# Patient Record
Sex: MALE | Race: BLACK OR AFRICAN AMERICAN | NOT HISPANIC OR LATINO | Employment: UNEMPLOYED | URBAN - NONMETROPOLITAN AREA
[De-identification: names, ages, dates, MRNs, and addresses within clinical notes are randomized per-mention and may not be internally consistent; named-entity substitution may affect disease eponyms.]

---

## 2023-07-19 PROBLEM — F20.9 SCHIZOPHRENIA: Status: ACTIVE | Noted: 2023-07-19

## 2023-07-19 PROBLEM — I10 HTN (HYPERTENSION): Status: ACTIVE | Noted: 2023-07-19

## 2023-09-20 PROBLEM — E78.5 HLD (HYPERLIPIDEMIA): Status: ACTIVE | Noted: 2023-09-20

## 2023-09-20 PROBLEM — Z13.9 ENCOUNTER FOR MEDICAL SCREENING EXAMINATION: Status: ACTIVE | Noted: 2023-09-20

## 2023-10-04 PROBLEM — F31.63 BIPOLAR 1 DISORDER, MIXED, SEVERE: Status: ACTIVE | Noted: 2023-10-04

## 2023-12-25 PROBLEM — Z13.9 ENCOUNTER FOR MEDICAL SCREENING EXAMINATION: Status: RESOLVED | Noted: 2023-09-20 | Resolved: 2023-12-25

## 2024-02-29 ENCOUNTER — HOSPITAL ENCOUNTER (INPATIENT)
Facility: HOSPITAL | Age: 29
LOS: 5 days | Discharge: HOME | DRG: 885 | End: 2024-03-05
Attending: PSYCHIATRY & NEUROLOGY | Admitting: PSYCHIATRY & NEUROLOGY
Payer: COMMERCIAL

## 2024-02-29 DIAGNOSIS — F29 PSYCHOSIS: ICD-10-CM

## 2024-02-29 LAB
CHOLEST SERPL-MCNC: 222 MG/DL (ref 120–199)
CHOLEST/HDLC SERPL: 5.6 {RATIO} (ref 2–5)
ESTIMATED AVG GLUCOSE: 120 MG/DL (ref 68–131)
HBA1C MFR BLD: 5.8 % (ref 4–5.6)
HDLC SERPL-MCNC: 40 MG/DL (ref 40–75)
HDLC SERPL: 18 % (ref 20–50)
LDLC SERPL CALC-MCNC: 163.6 MG/DL (ref 63–159)
NONHDLC SERPL-MCNC: 182 MG/DL
TRIGL SERPL-MCNC: 92 MG/DL (ref 30–150)

## 2024-02-29 PROCEDURE — 25000003 PHARM REV CODE 250: Performed by: STUDENT IN AN ORGANIZED HEALTH CARE EDUCATION/TRAINING PROGRAM

## 2024-02-29 PROCEDURE — 25000003 PHARM REV CODE 250: Performed by: INTERNAL MEDICINE

## 2024-02-29 PROCEDURE — 36415 COLL VENOUS BLD VENIPUNCTURE: CPT | Performed by: PSYCHIATRY & NEUROLOGY

## 2024-02-29 PROCEDURE — 99223 1ST HOSP IP/OBS HIGH 75: CPT | Mod: ,,, | Performed by: STUDENT IN AN ORGANIZED HEALTH CARE EDUCATION/TRAINING PROGRAM

## 2024-02-29 PROCEDURE — 11400000 HC PSYCH PRIVATE ROOM

## 2024-02-29 PROCEDURE — 80061 LIPID PANEL: CPT | Performed by: PSYCHIATRY & NEUROLOGY

## 2024-02-29 PROCEDURE — 83036 HEMOGLOBIN GLYCOSYLATED A1C: CPT | Performed by: PSYCHIATRY & NEUROLOGY

## 2024-02-29 RX ORDER — BENZTROPINE MESYLATE 1 MG/ML
2 INJECTION, SOLUTION INTRAMUSCULAR; INTRAVENOUS EVERY 8 HOURS PRN
Status: DISCONTINUED | OUTPATIENT
Start: 2024-02-29 | End: 2024-03-05 | Stop reason: HOSPADM

## 2024-02-29 RX ORDER — ALUMINUM HYDROXIDE, MAGNESIUM HYDROXIDE, AND SIMETHICONE 1200; 120; 1200 MG/30ML; MG/30ML; MG/30ML
30 SUSPENSION ORAL EVERY 6 HOURS PRN
Status: DISCONTINUED | OUTPATIENT
Start: 2024-02-29 | End: 2024-03-05 | Stop reason: HOSPADM

## 2024-02-29 RX ORDER — IBUPROFEN 200 MG
1 TABLET ORAL DAILY PRN
Status: DISCONTINUED | OUTPATIENT
Start: 2024-02-29 | End: 2024-03-05 | Stop reason: HOSPADM

## 2024-02-29 RX ORDER — ACETAMINOPHEN 325 MG/1
650 TABLET ORAL EVERY 6 HOURS PRN
Status: DISCONTINUED | OUTPATIENT
Start: 2024-02-29 | End: 2024-03-05 | Stop reason: HOSPADM

## 2024-02-29 RX ORDER — LOPERAMIDE HYDROCHLORIDE 2 MG/1
2 CAPSULE ORAL
Status: DISCONTINUED | OUTPATIENT
Start: 2024-02-29 | End: 2024-03-05 | Stop reason: HOSPADM

## 2024-02-29 RX ORDER — BENZONATATE 100 MG/1
100 CAPSULE ORAL 3 TIMES DAILY PRN
Status: DISCONTINUED | OUTPATIENT
Start: 2024-02-29 | End: 2024-03-05 | Stop reason: HOSPADM

## 2024-02-29 RX ORDER — OLANZAPINE 10 MG/2ML
10 INJECTION, POWDER, FOR SOLUTION INTRAMUSCULAR EVERY 8 HOURS PRN
Status: DISCONTINUED | OUTPATIENT
Start: 2024-02-29 | End: 2024-03-05 | Stop reason: HOSPADM

## 2024-02-29 RX ORDER — ONDANSETRON 4 MG/1
4 TABLET, ORALLY DISINTEGRATING ORAL EVERY 8 HOURS PRN
Status: DISCONTINUED | OUTPATIENT
Start: 2024-02-29 | End: 2024-03-05 | Stop reason: HOSPADM

## 2024-02-29 RX ORDER — OLANZAPINE 10 MG/1
10 TABLET ORAL EVERY 8 HOURS PRN
Status: DISCONTINUED | OUTPATIENT
Start: 2024-02-29 | End: 2024-03-05 | Stop reason: HOSPADM

## 2024-02-29 RX ORDER — AMLODIPINE BESYLATE 10 MG/1
10 TABLET ORAL DAILY
Status: DISCONTINUED | OUTPATIENT
Start: 2024-02-29 | End: 2024-03-05 | Stop reason: HOSPADM

## 2024-02-29 RX ORDER — PROMETHAZINE HYDROCHLORIDE 25 MG/1
25 TABLET ORAL EVERY 6 HOURS PRN
Status: DISCONTINUED | OUTPATIENT
Start: 2024-02-29 | End: 2024-03-05 | Stop reason: HOSPADM

## 2024-02-29 RX ORDER — HYDROXYZINE PAMOATE 50 MG/1
50 CAPSULE ORAL EVERY 6 HOURS PRN
Status: DISCONTINUED | OUTPATIENT
Start: 2024-02-29 | End: 2024-03-05 | Stop reason: HOSPADM

## 2024-02-29 RX ORDER — DIVALPROEX SODIUM 500 MG/1
500 TABLET, FILM COATED, EXTENDED RELEASE ORAL 2 TIMES DAILY
Status: DISCONTINUED | OUTPATIENT
Start: 2024-02-29 | End: 2024-03-05 | Stop reason: HOSPADM

## 2024-02-29 RX ADMIN — AMLODIPINE BESYLATE 10 MG: 10 TABLET ORAL at 11:02

## 2024-02-29 RX ADMIN — DIVALPROEX SODIUM 500 MG: 500 TABLET, FILM COATED, EXTENDED RELEASE ORAL at 08:02

## 2024-02-29 RX ADMIN — DIVALPROEX SODIUM 500 MG: 500 TABLET, FILM COATED, EXTENDED RELEASE ORAL at 12:02
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
## 2024-02-29 NOTE — ASSESSMENT & PLAN NOTE
Lab Results   Component Value Date    CHOL 222 (H) 02/29/2024    CHOL 249 (H) 06/03/2023    CHOL 237 (H) 09/16/2021     Lab Results   Component Value Date    HDL 40 02/29/2024    HDL 43 06/03/2023    HDL 34 (L) 09/16/2021     Lab Results   Component Value Date    LDLCALC 163.6 (H) 02/29/2024    LDLCALC 189 (H) 06/03/2023    LDLCALC 186 (H) 09/16/2021     Lab Results   Component Value Date    TRIG 92 02/29/2024    TRIG 86 06/03/2023    TRIG 83 09/16/2021       Lab Results   Component Value Date    CHOLHDL 18.0 (L) 02/29/2024    CHOLHDL 5.79 (H) 06/03/2023    CHOLHDL 6.97 (H) 09/16/2021

## 2024-02-29 NOTE — CONSULTS
RD consulted for new admit. As per chart the pt's appetite is adequate and pt is eating 100% of meals. Pt has no dietary issues at this time and nutrition services are not warranted. Please re-consult if any nutrition/dietary issues arise.

## 2024-02-29 NOTE — SUBJECTIVE & OBJECTIVE
No past medical history on file.    No past surgical history on file.    Review of patient's allergies indicates:  No Known Allergies    Current Facility-Administered Medications on File Prior to Encounter   Medication    [COMPLETED] cloNIDine tablet 0.2 mg    [COMPLETED] diphenhydrAMINE injection 50 mg    [COMPLETED] haloperidol lactate injection 5 mg     Current Outpatient Medications on File Prior to Encounter   Medication Sig    amLODIPine (NORVASC) 10 MG tablet Take 1 tablet (10 mg total) by mouth once daily.    divalproex (DEPAKOTE) 250 MG EC tablet Take 3 tablets (750 mg total) by mouth every evening.    paliperidone palmitate (INVEGA SUSTENNA) 156 mg/mL Syrg injection Inject 1 mL (156 mg total) into the muscle every 28 days.    sertraline (ZOLOFT) 100 MG tablet Take 1 tablet (100 mg total) by mouth once daily.    traZODone (DESYREL) 50 MG tablet Take 1 tablet (50 mg total) by mouth every evening.     Family History    None       Tobacco Use    Smoking status: Not on file    Smokeless tobacco: Not on file   Substance and Sexual Activity    Alcohol use: Not on file    Drug use: Not on file    Sexual activity: Not on file     Review of Systems   Constitutional:  Negative for fatigue and fever.   HENT:  Negative for congestion, ear pain and sore throat.    Eyes:  Negative for pain and discharge.   Respiratory:  Negative for cough, shortness of breath and wheezing.    Gastrointestinal:  Negative for abdominal pain, constipation, diarrhea, nausea and vomiting.   Endocrine: Negative for cold intolerance and heat intolerance.   Genitourinary:  Negative for difficulty urinating, dysuria and frequency.   Musculoskeletal:  Negative for arthralgias.   Allergic/Immunologic: Negative for environmental allergies.   Neurological:  Negative for dizziness, tremors and seizures.   Psychiatric/Behavioral:  Positive for behavioral problems and hallucinations. The patient is nervous/anxious.    All other systems reviewed and are  negative.    Objective:     Vital Signs (Most Recent):  Temp: 97.9 °F (36.6 °C) (02/29/24 0810)  Pulse: 97 (02/29/24 0810)  Resp: 20 (02/29/24 0810)  BP: (!) 118/56 (02/29/24 0810)  SpO2: 96 % (02/29/24 0810) Vital Signs (24h Range):  Temp:  [97.8 °F (36.6 °C)-98.2 °F (36.8 °C)] 97.9 °F (36.6 °C)  Pulse:  [] 97  Resp:  [16-21] 20  SpO2:  [94 %-96 %] 96 %  BP: (118-176)/() 118/56     Weight: 124.7 kg (274 lb 14.6 oz)  Body mass index is 40.6 kg/m².     Physical Exam  Vitals and nursing note reviewed.   Constitutional:       Appearance: Normal appearance. He is obese.   HENT:      Head: Normocephalic and atraumatic.      Nose: Nose normal.      Mouth/Throat:      Mouth: Mucous membranes are moist.      Pharynx: Oropharynx is clear.   Eyes:      Extraocular Movements: Extraocular movements intact.      Conjunctiva/sclera: Conjunctivae normal.      Pupils: Pupils are equal, round, and reactive to light.   Cardiovascular:      Rate and Rhythm: Normal rate and regular rhythm.      Pulses: Normal pulses.      Heart sounds: Normal heart sounds.   Pulmonary:      Effort: Pulmonary effort is normal.      Breath sounds: Normal breath sounds.   Abdominal:      General: Abdomen is flat. Bowel sounds are normal.      Palpations: Abdomen is soft.   Musculoskeletal:         General: Normal range of motion.      Cervical back: Normal range of motion and neck supple.   Skin:     General: Skin is warm and dry.      Capillary Refill: Capillary refill takes less than 2 seconds.      Comments: No rashes on limited skin exam.   Neurological:      General: No focal deficit present.      Mental Status: He is alert and oriented to person, place, and time.      Cranial Nerves: No cranial nerve deficit.      Comments: I Olfactory:  Sense of smell intact    II Optic:  Pupils equal round react to light.  Vision intact.    III, IV, VI, Ocular motor, Trochlear, Abducens:  Extraocular movements intact    V Trigeminal:  Facial sensation  intact facial sensation intact,, muscles of mastication intact muscles of mastication intact, corneal reflex intact, corneal reflex intact    VII Facial:  Muscles of facial expression intact     VIII Vestibular cochlear: Hearing intact vestibular cochlear: Hearing intact    IX Glossopharyngeal:  Gag reflex intact.  Tasting intact.     X Vagus:  Gag reflex intact.    XI Spinal Accessory:  Shoulder shrug intact.  Head rotation intact.    XII Hypoglossal:  Tongue movements intact.     Psychiatric:         Attention and Perception: He perceives auditory and visual hallucinations.         Mood and Affect: Mood is anxious.         Thought Content: Thought content is paranoid.         Cognition and Memory: Cognition is impaired.         Judgment: Judgment is inappropriate.              CRANIAL NERVES     CN III, IV, VI   Pupils are equal, round, and reactive to light.       Significant Labs: All pertinent labs within the past 24 hours have been reviewed.    Significant Imaging: I have reviewed all pertinent imaging results/findings within the past 24 hours.

## 2024-02-29 NOTE — ASSESSMENT & PLAN NOTE
Chronic, controlled. Latest blood pressure and vitals reviewed-     Temp:  [97.8 °F (36.6 °C)-98.2 °F (36.8 °C)]   Pulse:  []   Resp:  [16-21]   BP: (118-176)/()   SpO2:  [94 %-96 %] .   Home meds for hypertension were reviewed and noted below.   Hypertension Medications               amLODIPine (NORVASC) 10 MG tablet Take 1 tablet (10 mg total) by mouth once daily.            While in the hospital, will manage blood pressure as follows; Continue home antihypertensive regimen    Will utilize p.r.n. blood pressure medication only if patient's blood pressure greater than 180/110 and he develops symptoms such as worsening chest pain or shortness of breath.

## 2024-02-29 NOTE — H&P
[facility] Hospital Medicine  History & Physical    Patient Name: Jaki Gong  MRN: [id number]  Patient Class: IP- Psych  Admission Date: 2/29/2024  Attending Physician: Alejandro Sparrow MD   Primary Care Provider: Anu Primary Doctor         Patient information was obtained from ER records.     Subjective:     Principal Problem:Schizophrenia    Chief Complaint: No chief complaint on file.       HPI:   Psychiatric Evaluation        AUDITORY/VISUAL HALLUCINATIONS, PATIENT VERBALIZED KNOWING THEY ARE NOT REAL, MAKING STATEMENTS ABOUT BERRY AND THE DEVIL, REPORTS MISSING MEDICATIONS      This is a 28 year old male with schizophrenia who presents to the ED with father after concerns of psychotic behavior and not taking his psychotropic medications for over 2 days.  He has a flat affect with delusions and hallucinations.  The history is limited.  He denies suicidal or homicidal ideation.     No past medical history on file.    No past surgical history on file.    Review of patient's allergies indicates:  No Known Allergies    Current Facility-Administered Medications on File Prior to Encounter   Medication    [COMPLETED] cloNIDine tablet 0.2 mg    [COMPLETED] diphenhydrAMINE injection 50 mg    [COMPLETED] haloperidol lactate injection 5 mg     Current Outpatient Medications on File Prior to Encounter   Medication Sig    amLODIPine (NORVASC) 10 MG tablet Take 1 tablet (10 mg total) by mouth once daily.    divalproex (DEPAKOTE) 250 MG EC tablet Take 3 tablets (750 mg total) by mouth every evening.    paliperidone palmitate (INVEGA SUSTENNA) 156 mg/mL Syrg injection Inject 1 mL (156 mg total) into the muscle every 28 days.    sertraline (ZOLOFT) 100 MG tablet Take 1 tablet (100 mg total) by mouth once daily.    traZODone (DESYREL) 50 MG tablet Take 1 tablet (50 mg total) by mouth every evening.     Family History    None       Tobacco Use    Smoking status: Not on file    Smokeless  tobacco: Not on file   Substance and Sexual Activity    Alcohol use: Not on file    Drug use: Not on file    Sexual activity: Not on file     Review of Systems   Constitutional:  Negative for fatigue and fever.   HENT:  Negative for congestion, ear pain and sore throat.    Eyes:  Negative for pain and discharge.   Respiratory:  Negative for cough, shortness of breath and wheezing.    Gastrointestinal:  Negative for abdominal pain, constipation, diarrhea, nausea and vomiting.   Endocrine: Negative for cold intolerance and heat intolerance.   Genitourinary:  Negative for difficulty urinating, dysuria and frequency.   Musculoskeletal:  Negative for arthralgias.   Allergic/Immunologic: Negative for environmental allergies.   Neurological:  Negative for dizziness, tremors and seizures.   Psychiatric/Behavioral:  Positive for behavioral problems and hallucinations. The patient is nervous/anxious.    All other systems reviewed and are negative.    Objective:     Vital Signs (Most Recent):  Temp: 97.9 °F (36.6 °C) (02/29/24 0810)  Pulse: 97 (02/29/24 0810)  Resp: 20 (02/29/24 0810)  BP: (!) 118/56 (02/29/24 0810)  SpO2: 96 % (02/29/24 0810) Vital Signs (24h Range):  Temp:  [97.8 °F (36.6 °C)-98.2 °F (36.8 °C)] 97.9 °F (36.6 °C)  Pulse:  [] 97  Resp:  [16-21] 20  SpO2:  [94 %-96 %] 96 %  BP: (118-176)/() 118/56     Weight: 124.7 kg (274 lb 14.6 oz)  Body mass index is 40.6 kg/m².     Physical Exam  Vitals and nursing note reviewed.   Constitutional:       Appearance: Normal appearance. He is obese.   HENT:      Head: Normocephalic and atraumatic.      Nose: Nose normal.      Mouth/Throat:      Mouth: Mucous membranes are moist.      Pharynx: Oropharynx is clear.   Eyes:      Extraocular Movements: Extraocular movements intact.      Conjunctiva/sclera: Conjunctivae normal.      Pupils: Pupils are equal, round, and reactive to light.   Cardiovascular:      Rate and Rhythm: Normal rate and regular rhythm.       Pulses: Normal pulses.      Heart sounds: Normal heart sounds.   Pulmonary:      Effort: Pulmonary effort is normal.      Breath sounds: Normal breath sounds.   Abdominal:      General: Abdomen is flat. Bowel sounds are normal.      Palpations: Abdomen is soft.   Musculoskeletal:         General: Normal range of motion.      Cervical back: Normal range of motion and neck supple.   Skin:     General: Skin is warm and dry.      Capillary Refill: Capillary refill takes less than 2 seconds.      Comments: No rashes on limited skin exam.   Neurological:      General: No focal deficit present.      Mental Status: He is alert and oriented to person, place, and time.      Cranial Nerves: No cranial nerve deficit.      Comments: I Olfactory:  Sense of smell intact    II Optic:  Pupils equal round react to light.  Vision intact.    III, IV, VI, Ocular motor, Trochlear, Abducens:  Extraocular movements intact    V Trigeminal:  Facial sensation intact facial sensation intact,, muscles of mastication intact muscles of mastication intact, corneal reflex intact, corneal reflex intact    VII Facial:  Muscles of facial expression intact     VIII Vestibular cochlear: Hearing intact vestibular cochlear: Hearing intact    IX Glossopharyngeal:  Gag reflex intact.  Tasting intact.     X Vagus:  Gag reflex intact.    XI Spinal Accessory:  Shoulder shrug intact.  Head rotation intact.    XII Hypoglossal:  Tongue movements intact.     Psychiatric:         Attention and Perception: He perceives auditory and visual hallucinations.         Mood and Affect: Mood is anxious.         Thought Content: Thought content is paranoid.         Cognition and Memory: Cognition is impaired.         Judgment: Judgment is inappropriate.              CRANIAL NERVES     CN III, IV, VI   Pupils are equal, round, and reactive to light.       Significant Labs: All pertinent labs within the past 24 hours have been reviewed.    Significant Imaging: I have reviewed  all pertinent imaging results/findings within the past 24 hours.  Assessment/Plan:     * Schizophrenia  To be admitted to our inpatient psychiatric unit for further evaluation and management.      Bipolar 1 disorder, mixed, severe  To be admitted to our inpatient psychiatric unit for further evaluation and management.        HLD (hyperlipidemia)  Lab Results   Component Value Date    CHOL 222 (H) 02/29/2024    CHOL 249 (H) 06/03/2023    CHOL 237 (H) 09/16/2021     Lab Results   Component Value Date    HDL 40 02/29/2024    HDL 43 06/03/2023    HDL 34 (L) 09/16/2021     Lab Results   Component Value Date    LDLCALC 163.6 (H) 02/29/2024    LDLCALC 189 (H) 06/03/2023    LDLCALC 186 (H) 09/16/2021     Lab Results   Component Value Date    TRIG 92 02/29/2024    TRIG 86 06/03/2023    TRIG 83 09/16/2021       Lab Results   Component Value Date    CHOLHDL 18.0 (L) 02/29/2024    CHOLHDL 5.79 (H) 06/03/2023    CHOLHDL 6.97 (H) 09/16/2021         HTN (hypertension)  Chronic, controlled. Latest blood pressure and vitals reviewed-     Temp:  [97.8 °F (36.6 °C)-98.2 °F (36.8 °C)]   Pulse:  []   Resp:  [16-21]   BP: (118-176)/()   SpO2:  [94 %-96 %] .   Home meds for hypertension were reviewed and noted below.   Hypertension Medications               amLODIPine (NORVASC) 10 MG tablet Take 1 tablet (10 mg total) by mouth once daily.            While in the hospital, will manage blood pressure as follows; Continue home antihypertensive regimen    Will utilize p.r.n. blood pressure medication only if patient's blood pressure greater than 180/110 and he develops symptoms such as worsening chest pain or shortness of breath.      VTE Risk Mitigation (From admission, onward)      None                            Paulie Valero Jr, MD  Department of Hospital Medicine  [facility] ([facility])

## 2024-02-29 NOTE — NURSING
Upon arrival to unit, Patient did not want to participate in the interview process for admission.

## 2024-02-29 NOTE — NURSING
This is a 28 year old male with schizophrenia who presents to the ED with father after concerns of psychotic behavior and not taking his psychotropic medications for over 2 days.  He has a flat affect with delusions and hallucinations.  The history is limited.  He denies suicidal or homicidal ideation. Patient making statements about the devil and Brianne and the devil.         Patient very sleepy upon arrival. Patient asked to go to bed as soon as he came on the unit.  Very bad body order and did not want to participate in medication

## 2024-02-29 NOTE — NURSING
Plan of care reviewed.  Pt has been in his bedroom most of the day.  Has only been out of his room for meals.  Pt remains withdrawn,isolative, hearing voices.   States he cannot make out what the voices are telling him.  States he has been noncompliant with his meds for the last year or two.  Pt denies si, hi.  Pt took am meds without any side effects noted.  Appetite adequate..  pt instructed to call staff for any needs or concerns at all. Verbalized understanding.  Will cont to monitor for safety.  Patient care ongoing.

## 2024-02-29 NOTE — HPI
Psychiatric Evaluation        AUDITORY/VISUAL HALLUCINATIONS, PATIENT VERBALIZED KNOWING THEY ARE NOT REAL, MAKING STATEMENTS ABOUT BERRY AND THE DEVIL, REPORTS MISSING MEDICATIONS      This is a 28 year old male with schizophrenia who presents to the ED with father after concerns of psychotic behavior and not taking his psychotropic medications for over 2 days.  He has a flat affect with delusions and hallucinations.  The history is limited.  He denies suicidal or homicidal ideation.

## 2024-03-01 PROCEDURE — 11400000 HC PSYCH PRIVATE ROOM

## 2024-03-01 PROCEDURE — 25000003 PHARM REV CODE 250: Performed by: INTERNAL MEDICINE

## 2024-03-01 PROCEDURE — 99232 SBSQ HOSP IP/OBS MODERATE 35: CPT | Mod: ,,, | Performed by: PSYCHIATRY & NEUROLOGY

## 2024-03-01 PROCEDURE — 90833 PSYTX W PT W E/M 30 MIN: CPT | Mod: ,,, | Performed by: PSYCHIATRY & NEUROLOGY

## 2024-03-01 PROCEDURE — 25000003 PHARM REV CODE 250: Performed by: STUDENT IN AN ORGANIZED HEALTH CARE EDUCATION/TRAINING PROGRAM

## 2024-03-01 RX ADMIN — DIVALPROEX SODIUM 500 MG: 500 TABLET, FILM COATED, EXTENDED RELEASE ORAL at 08:03

## 2024-03-01 RX ADMIN — AMLODIPINE BESYLATE 10 MG: 10 TABLET ORAL at 08:03

## 2024-03-01 RX ADMIN — DIVALPROEX SODIUM 500 MG: 500 TABLET, FILM COATED, EXTENDED RELEASE ORAL at 09:03
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
## 2024-03-01 NOTE — PROGRESS NOTES
Attempted x2 to call Radha ([email or web address]) to ascertain most recent [facility] info. No answer, no option to leave voicemail.

## 2024-03-01 NOTE — PLAN OF CARE
Problem: Adult Behavioral Health Plan of Care  Goal: Develops/Participates in Therapeutic [place] to Support Successful Transition  Intervention: Mutually Develop Transition Plan  Flowsheets (Taken 3/1/2024 4559)  Current Discharge Risk:   psychiatric illness   cognitively impaired  Readmission Within the Last 30 Days: no previous admission in last 30 days  Outpatient/Agency/Support Group Needs: [facility]  Transition Support: follow-up care discussed  Anticipated Discharge Disposition: home with family  Transportation Concerns: none  Patient/Family Anticipated Services at Transition:   mental health services   outpatient care  Patient/Family Anticipates Transition to: home with family  Transportation Anticipated: health plan transportation  Concerns to be Addressed:   mental health   medication

## 2024-03-01 NOTE — PLAN OF CARE
POC reviewed this shift and is on going. Patient is withdrawn, depressed, calm, cooperative, quiet, anxious, and sleeping. Endorses Auditory Hallucinations. Denies Suicidal Ideation, Homicidal Ideation, Visual Hallucinations, Tactile Hallucinations, Gustatory Hallucinations, and Delusions. Patient is withdrawn to his room all day. Patient comes out for meals. Patient did not participate in the group session conducted today. Pt continues to be medication compliant and staff will continue to monitor pt closely while on the unit.

## 2024-03-01 NOTE — PLAN OF CARE
03/01/24 1604   Step 1: Warning Signs   Warning Sign 1 Playing loud music   Warning Sign 2 People aggravating me   Step 2: Internal coping strategies - Things I can do to take my mind off my problems without contacting another person:   Coping Strategy 1 Looking at TV   Coping Strategy 2 Listening to Music   Coping Strategy 3 Lifting weights   Step 3: People and social settings that provide distraction:   1. Name Ne Guevara, father       Phone [phone]   2. Name Ann Gong, sister       Phone n/a   3. Place [employer]   4. Place Movies    Step 4: People whom I can ask for help:   1. Person  [employer] [place] in [place]       Phone [phone]   2. Person Ne Guevara, father       Phone [phone]   3. Person Ann Gong, sister       Phone n/a   Step 5: Professionals or agencies I can contact during a crisis:   1. Clinician Name [facility] in [place]       Phone [phone]   3. Suicide Prevention Lifeline: 988 Suicide Prevention Lifeline: 988   4. [facility]       Warm Line [phone]   Step 6: Making the environment safer (plan for lethal means safety)   Safe Environment Plan Take deep breaths; Do something to occupy myself to lower my stress   Safe Environment Optional: What is most important to me and worth living for? Living every day   Safety Plan Tasks   Provided a Hard Copy to the Patient Y   Explained How to Follow the Steps Y   Discussed Facilitators and Barriers Y

## 2024-03-01 NOTE — NURSING
Pt. Is withdrawn to room  noticed to be asleep with resp. Even and unlabored, but easy to arouse. Pt. Stated that he's still hearing voiced at times. He denied any c/o visual hallucinations. Pt. Denied any c/o suicidal or homicidal ideations. He took his medications without diff. He ate his snack in dining room and return to his room and is resting at this time. Will cont. Monitor Pt.

## 2024-03-01 NOTE — PROGRESS NOTES
"PSYCHIATRY DAILY INPATIENT PROGRESS NOTE  SUBSEQUENT HOSPITAL VISIT    ENCOUNTER DATE: 3/1/2024  SITE: [facility]    DATE OF ADMISSION: 2/29/2024  4:17 AM  LENGTH OF STAY: 1 days      CHIEF COMPLAINT   Jaki Gong is a 28 y.o. male, seen during daily dennis rounds on the inpatient unit.  Jaki Gong presented with the chief complaint of psychosis      The patient was seen and examined. The chart was reviewed.     Reviewed notes from Rns and MD and labs from the last 24 hours.    The patient's case was discussed with the treatment team/care providers today including Rns and MD    Staff reports no behavioral or management issues.     The patient has been compliant with treatment.      Subjective 03/01/2024       Today the patient reports mood is "alright," affect is appropriate, blunted. Patient endorses recurrence of auditory hallucinations experienced as recently as yesterday evening (denies AH this morning). Reports history of schizophrenia, most recently prescribed Invega Sustenna which he states he received "about a month ago." Patient reports relative stability on this medication, adding that his most recent previous hospitalization was "a long time ago." Pt receives injection from [facility] ACT team.     Awaiting return call from [facility] to verify last dose/date of injection.     Patient is amenable to continue STOKES if due.       The patient denies any side effects to medications.        Interim/overnight events per report/notes:          Psychiatric ROS (observed, reported, or endorsed/denied):  Depressed mood - No  Interest/pleasure/anhedonia: No  Guilt/hopelessness/worthlessness - No  Changes in Sleep - No  Changes in Appetite - No  Changes in Concentration - No  Changes in Energy - No  PMA/R- No  Suicidal- active/passive ideations - No  Homicidal ideations: active/passive ideations - No    Hallucinations - Yes  Delusions - No  Disorganized behavior - No  Disorganized speech - No  Negative symptoms - " Yes    Elevated mood - No  Decreased need for sleep - No  Grandiosity - No  Racing thoughts - No  Impulsivity - No  Irritability- No  Increased energy - No  Distractibility - No  Increase in goal-directed activity or PMA- No    Symptoms of ART - No  Symptoms of Panic Disorder- No  Symptoms of PTSD - No        Overall progress: Patient is showing no improvement on the Unit to date        Psychotherapy:  Target symptoms: psychosis  Why chosen therapy is appropriate versus another modality: relevant to diagnosis, evidence based practice  Outcome monitoring methods: self-report, observation  Therapeutic intervention type: insight oriented psychotherapy, supportive psychotherapy  Topics discussed/themes: building skills sets for symptom management, symptom recognition  The patient's response to the intervention is guarded. The patient's progress toward treatment goals is fair.   Duration of intervention: 16 minutes.        Medical ROS  Review of Systems   Constitutional: Negative.    HENT: Negative.     Eyes: Negative.    Respiratory: Negative.     Cardiovascular: Negative.    Gastrointestinal: Negative.    Genitourinary: Negative.    Musculoskeletal: Negative.    Skin: Negative.    Neurological: Negative.    Endo/Heme/Allergies: Negative.    Psychiatric/Behavioral:  Positive for hallucinations.          PAST MEDICAL HISTORY   No past medical history on file.        PSYCHOTROPIC MEDICATIONS   Scheduled Meds:   amLODIPine  10 mg Oral Daily    divalproex ER  500 mg Oral BID     Continuous Infusions:  PRN Meds:.acetaminophen, aluminum-magnesium hydroxide-simethicone, benzonatate, benztropine mesylate, hydrOXYzine pamoate, loperamide, nicotine, OLANZapine **AND** OLANZapine, ondansetron, promethazine        EXAMINATION    VITALS   Vitals:    02/29/24 0427 02/29/24 0810 02/29/24 1920 03/01/24 0736   BP: 139/82 (!) 118/56 123/75 131/82   BP Location: Left arm Left arm Left arm Right arm   Patient Position: Sitting Lying  "Sitting Sitting   Pulse: 100 97 101 92   Resp: 18 20 20 20   Temp: 98.2 °F (36.8 °C) 97.9 °F (36.6 °C) 98.6 °F (37 °C) 98.4 °F (36.9 °C)   TempSrc: Oral Oral Oral Oral   SpO2: 96% 96% 95% 97%   Weight: 124.7 kg (274 lb 14.6 oz)      Height: 5' 9" (1.753 m)          Body mass index is 40.6 kg/m².        CONSTITUTIONAL  General Appearance: unremarkable, age appropriate, obese    MUSCULOSKELETAL  Muscle Strength and Tone:no tremor, no tic  Abnormal Involuntary Movements: No  Gait and Station: non-ataxic    PSYCHIATRIC   Level of Consciousness: awake, alert , and oriented  Orientation: person, place, time, and situation  Grooming: Disheveled and Hospital garb  Psychomotor Behavior: normal, cooperative, eye contact minimal  Speech: normal tone, normal rate, normal pitch, normal volume  Language: grossly intact  Mood: fine  Affect: Blunted and Constricted  Thought Process: linear, logical  Associations: intact   Thought Content: denies SI and denies HI  Perceptions: Endorses recent auditory hallucinations  Memory: Impaired to some degree  Attention:Easily distracted  Fund of Knowledge: Impaired  Estimate if Intelligence:  Average based on work/education history, vocabulary and mental status exam  Insight: limited awareness of illness  Judgment: limited        DIAGNOSTIC TESTING   Laboratory Results  No results found for this or any previous visit (from the past 24 hour(s)).          MEDICAL DECISION MAKING      ASSESSMENT:   Schizophrenia, chronic, with acute exacerbation, severe  Obesity  Elevated A1c/BG  Elevated LDL  HTN              PROBLEM LIST AND MANAGEMENT PLANS        Schizophrenia, chronic, with acute exacerbation, severe  - pt reports due to invega STOKES dose tomorrow, will need to contact [facility] ACT team to verify  - resume depakote at 500 mg PO BID, will check level for toxicity  - pt counseled     Obesity  - avoid medications with high potential for serious weight gain (remeron, seroquel, etc.)     Elevated " A1c/BG  - FM consulted  - consider metformin     Elevated LDL  - FM consulted  - consider statin     HTN  - FM consulted  - on norvasc at home, consider resuming        Discussed diagnosis, risks and benefits of proposed treatment vs alternative treatments vs no treatment, potential side effects of these treatments and the inherent unpredictability of treatment. The patient expresses understanding of the above and displays the capacity to agree with this treatment given said understanding. Patient also agrees that, currently, the benefits outweigh the risks and would like to pursue/continue treatment at this time.    Any medications being used off-label were discussed with the patient inclusive of the evidence base for the use of the medications and consent was obtained for the off-label use of the medication.       DISCHARGE PLANNING  Expected Disposition Plan: Home or Self Care      NEED FOR CONTINUED HOSPITALIZATION  Psychiatric illness continues to pose a potential threat to life or bodily function, of self or others, thereby requiring the need for continued inpatient psychiatric hospitalization: Yes, due to: significant psychotic thought disorder, as evidenced by:  Ongoing concerns with grave disability with patient unable to perform basic feeding, hygiene and dressing activities without significant constant support.    Protective inpatient pyschiatric hospitalization required while a safe disposition plan is enacted: Yes    Patient stabilized and ready for discharge from inpatient psychiatric unit: No        STAFF:   Carlin Amador NP  Psychiatry
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
## 2024-03-02 PROCEDURE — 25000003 PHARM REV CODE 250: Performed by: INTERNAL MEDICINE

## 2024-03-02 PROCEDURE — 90833 PSYTX W PT W E/M 30 MIN: CPT | Mod: ,,, | Performed by: PSYCHIATRY & NEUROLOGY

## 2024-03-02 PROCEDURE — 25000003 PHARM REV CODE 250: Performed by: STUDENT IN AN ORGANIZED HEALTH CARE EDUCATION/TRAINING PROGRAM

## 2024-03-02 PROCEDURE — 99233 SBSQ HOSP IP/OBS HIGH 50: CPT | Mod: ,,, | Performed by: PSYCHIATRY & NEUROLOGY

## 2024-03-02 PROCEDURE — 11400000 HC PSYCH PRIVATE ROOM

## 2024-03-02 RX ADMIN — DIVALPROEX SODIUM 500 MG: 500 TABLET, FILM COATED, EXTENDED RELEASE ORAL at 08:03

## 2024-03-02 RX ADMIN — AMLODIPINE BESYLATE 10 MG: 10 TABLET ORAL at 08:03

## 2024-03-02 NOTE — PLAN OF CARE
POC reviewed this shift and is on going. Patient is withdrawn, calm, cooperative, quiet, anxious, sleeping, and showing pressured speech. Endorses Auditory Hallucinations. Denies Suicidal Ideation, Homicidal Ideation, Visual Hallucinations, Tactile Hallucinations, Gustatory Hallucinations, and Delusions. Patient isolates to his room sleeping. Patient still having some auditory hallucinations at times. Patient reports feeling better. Pt continues to be medication compliant and staff will continue to monitor pt closely while on the unit.
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
## 2024-03-02 NOTE — PLAN OF CARE
Problem: Adult Behavioral Health Plan of Care  Goal: Plan of Care Review  Outcome: Ongoing, Progressing  Goal: Patient-Specific Goal (Individualization)  Outcome: Ongoing, Progressing  Goal: Adheres to Safety Considerations for Self and Others  Outcome: Ongoing, Progressing  Goal: Absence of New-Onset Illness or Injury  Outcome: Ongoing, Progressing  Goal: Optimized Coping Skills in Response to Life Stressors  Outcome: Ongoing, Progressing     Problem: Behavior Regulation Impairment (Psychotic Signs/Symptoms)  Goal: Improved Behavioral Control (Psychotic Signs/Symptoms)  Outcome: Ongoing, Progressing     Problem: Decreased Participation and Engagement (Psychotic Signs/Symptoms)  Goal: Increased Participation and Engagement (Psychotic Signs/Symptoms)  Outcome: Ongoing, Progressing     Problem: Mood Impairment (Psychotic Signs/Symptoms)  Goal: Improved Mood Symptoms (Psychotic Signs/Symptoms)  Outcome: Ongoing, Progressing     Problem: Coping Ineffective  Goal: Effective Coping  Outcome: Ongoing, Progressing

## 2024-03-02 NOTE — NURSING
POC reviewed this shift and is ongoing.  Pt is cooperative with care and compliant with medications.  Pt denies SI/HI/VH, endorses AH.  Pt remains largely isolated to self and room.  Pt out of room only for snacks, vitals and medications.  Pt had minimal interactions with staff and no peer interactions noted.

## 2024-03-03 PROCEDURE — 25000003 PHARM REV CODE 250: Performed by: STUDENT IN AN ORGANIZED HEALTH CARE EDUCATION/TRAINING PROGRAM

## 2024-03-03 PROCEDURE — 11400000 HC PSYCH PRIVATE ROOM

## 2024-03-03 PROCEDURE — 99233 SBSQ HOSP IP/OBS HIGH 50: CPT | Mod: ,,, | Performed by: PSYCHIATRY & NEUROLOGY

## 2024-03-03 PROCEDURE — 25000003 PHARM REV CODE 250: Performed by: PSYCHIATRY & NEUROLOGY

## 2024-03-03 PROCEDURE — 25000003 PHARM REV CODE 250: Performed by: INTERNAL MEDICINE

## 2024-03-03 RX ORDER — PROPRANOLOL HYDROCHLORIDE 10 MG/1
10 TABLET ORAL 2 TIMES DAILY
Status: DISCONTINUED | OUTPATIENT
Start: 2024-03-03 | End: 2024-03-05 | Stop reason: HOSPADM

## 2024-03-03 RX ADMIN — PROPRANOLOL HYDROCHLORIDE 10 MG: 10 TABLET ORAL at 10:03

## 2024-03-03 RX ADMIN — PROPRANOLOL HYDROCHLORIDE 10 MG: 10 TABLET ORAL at 08:03

## 2024-03-03 RX ADMIN — DIVALPROEX SODIUM 500 MG: 500 TABLET, FILM COATED, EXTENDED RELEASE ORAL at 08:03

## 2024-03-03 RX ADMIN — AMLODIPINE BESYLATE 10 MG: 10 TABLET ORAL at 08:03

## 2024-03-03 NOTE — NURSING
POC reviewed this shift and is ongoing.  Pt is calm and cooperative on unit and complaint with medications.  Pt denies SI/HI/VH, endorses AH.  Pt remains largely isolated to self and room, out only for snacks, vitals and medications.  Pt has minimal interactions with staff and no peers interactions noted this shift.

## 2024-03-03 NOTE — PROGRESS NOTES
"PSYCHIATRY DAILY INPATIENT PROGRESS NOTE  SUBSEQUENT HOSPITAL VISIT    ENCOUNTER DATE: 3/3/2024  SITE: [facility]    DATE OF ADMISSION: 2/29/2024  4:17 AM  LENGTH OF STAY: 3 days      CHIEF COMPLAINT   Jaki Gong is a 28 y.o. male, seen during daily dennis rounds on the inpatient unit.  Jaki Gong presented with the chief complaint of psychosis      The patient was seen and examined. The chart was reviewed.     Reviewed notes from Rns and labs from the last 24 hours.    The patient's case was discussed with the treatment team/care providers today including Rns    Staff reports no behavioral or management issues.     The patient has been compliant with treatment.      Subjective 03/03/2024    Today, the patient again reports that he is doing " better." He discussed how he and his father "got into it" but this was vaguely.  -he notes less AH; possible RIS noted  Still seeking collateral notes from [facility].   -he discussed grief over his cousin's death.     His BP has been elevated; he is asymptomatic.     No aggressive behaviors has been displayed on the unit.    The patient denies any side effects to medications.          Psychiatric ROS (observed, reported, or endorsed/denied):  Depressed mood - No  Interest/pleasure/anhedonia: No  Guilt/hopelessness/worthlessness - No  Changes in Sleep - No  Changes in Appetite - No  Changes in Concentration - No  Changes in Energy - No  PMA/R- No  Suicidal- active/passive ideations - No  Homicidal ideations: active/passive ideations - No    Hallucinations - decreasing steadily  Delusions - No  Disorganized behavior - No  Disorganized speech - No  Negative symptoms - Yes    Elevated mood - No  Decreased need for sleep - No  Grandiosity - No  Racing thoughts - No  Impulsivity - No  Irritability- No  Increased energy - No  Distractibility - No  Increase in goal-directed activity or PMA- No    Symptoms of ART - No  Symptoms of Panic Disorder- No  Symptoms of PTSD - " No        Overall progress: Patient is showing mild improvement         Psychotherapy:  Target symptoms: psychosis  Why chosen therapy is appropriate versus another modality: relevant to diagnosis, evidence based practice  Outcome monitoring methods: self-report, observation  Therapeutic intervention type: insight oriented psychotherapy, supportive psychotherapy  Topics discussed/themes: building skills sets for symptom management, symptom recognition  The patient's response to the intervention is more accepting. The patient's progress toward treatment goals is fair.   Duration of intervention: 8 minutes.        Medical ROS  Review of Systems   Constitutional: Negative.    HENT: Negative.     Eyes: Negative.    Respiratory: Negative.     Cardiovascular: Negative.    Gastrointestinal: Negative.    Genitourinary: Negative.    Musculoskeletal: Negative.    Skin: Negative.    Neurological: Negative.    Endo/Heme/Allergies: Negative.    Psychiatric/Behavioral:          See HPI         PAST MEDICAL HISTORY   History reviewed. No pertinent past medical history.        PSYCHOTROPIC MEDICATIONS   Scheduled Meds:   amLODIPine  10 mg Oral Daily    divalproex ER  500 mg Oral BID     Continuous Infusions:  PRN Meds:.acetaminophen, aluminum-magnesium hydroxide-simethicone, benzonatate, benztropine mesylate, hydrOXYzine pamoate, loperamide, nicotine, OLANZapine **AND** OLANZapine, ondansetron, promethazine        EXAMINATION    VITALS   Vitals:    03/01/24 1918 03/02/24 0731 03/02/24 1914 03/03/24 0736   BP: 125/78 (!) 165/104 124/71 (!) 164/107   BP Location:  Left arm Left arm Left arm   Patient Position: Sitting Sitting Sitting Sitting   Pulse: (!) 120 106 98 106   Resp: 20 20 20 20   Temp: 98.4 °F (36.9 °C) 98.6 °F (37 °C) 98.7 °F (37.1 °C) 98.7 °F (37.1 °C)   TempSrc: Oral Oral Oral Oral   SpO2: (!) 94% 96% 95% 95%   Weight:       Height:           Body mass index is 40.6 kg/m².        CONSTITUTIONAL  General Appearance:  unremarkable, age appropriate, obese    MUSCULOSKELETAL  Muscle Strength and Tone:no tremor, no tic  Abnormal Involuntary Movements: No  Gait and Station: non-ataxic    PSYCHIATRIC   Level of Consciousness: awake, alert , and oriented  Orientation: person, place, time, and situation  Grooming: improved; in Hospital garb  Psychomotor Behavior: normal, cooperative, eye contact minimal  Speech: normal tone, normal rate, normal pitch, normal volume  Language: grossly intact  Mood: fine  Affect: Blunted and Constricted  Thought Process: linear, logical  Associations: intact   Thought Content: denies SI and denies HI  Perceptions: Endorses recent auditory hallucinations  Memory: Improving; intact to recent and remote events   Attention:less Easily distracted- more intact to conversation  Fund of Knowledge: Impaired  Estimate if Intelligence:  Average based on work/education history, vocabulary and mental status exam  Insight: limited awareness of illness but improving   Judgment: limited but improving        DIAGNOSTIC TESTING   Laboratory Results  No results found for this or any previous visit (from the past 24 hour(s)).          MEDICAL DECISION MAKING      ASSESSMENT:   Schizophrenia, chronic, with acute exacerbation, severe  Obesity  Elevated A1c/BG  Elevated LDL  HTN  Psychosocial stressors             PROBLEM LIST AND MANAGEMENT PLANS        Schizophrenia, chronic, with acute exacerbation, severe  - pt reports due to invega STOKES dose tomorrow, will need to contact [facility] ACT team to verify  - resumed/continue depakote at 500 mg PO BID, will check level for toxicity Monday AM  - pt counseled     Obesity: pt counseled   - avoid medications with high potential for serious weight gain (remeron, seroquel, etc.)     Elevated A1c/BG: pt counseled   - FM consulted  - consider metformin     Elevated LDL: pt counseled   - FM consulted  - consider statin     HTN: pt counseled   - FM consulted  - on norvasc at  home   Resumed/continue Norvasc 10 mg po q Day  -start trial of propranolol 10 mg po TID     Psychosocial stressors: pt counseled  -SW consulted to assist with resources         Discussed diagnosis, risks and benefits of proposed treatment vs alternative treatments vs no treatment, potential side effects of these treatments and the inherent unpredictability of treatment. The patient expresses understanding of the above and displays the capacity to agree with this treatment given said understanding. Patient also agrees that, currently, the benefits outweigh the risks and would like to pursue/continue treatment at this time.    Any medications being used off-label were discussed with the patient inclusive of the evidence base for the use of the medications and consent was obtained for the off-label use of the medication.       DISCHARGE PLANNING  Expected Disposition Plan: Home or Self Care- pt will likely be stable for discharge on Tuesday       NEED FOR CONTINUED HOSPITALIZATION  Psychiatric illness continues to pose a potential threat to life or bodily function, of self or others, thereby requiring the need for continued inpatient psychiatric hospitalization: Yes, due to: significant psychotic thought disorder, as evidenced by:  Ongoing concerns with grave disability with patient unable to perform basic feeding, hygiene and dressing activities without significant constant support.    Protective inpatient pyschiatric hospitalization required while a safe disposition plan is enacted: Yes    Patient stabilized and ready for discharge from inpatient psychiatric unit: No        STAFF:   Alejandro Sparrow MD  Psychiatry

## 2024-03-03 NOTE — PLAN OF CARE
Problem: Adult Behavioral Health Plan of Care  Goal: Plan of Care Review  Outcome: Ongoing, Progressing  Goal: Patient-Specific Goal (Individualization)  Outcome: Ongoing, Progressing  Goal: Adheres to Safety Considerations for Self and Others  Outcome: Ongoing, Progressing  Goal: Absence of New-Onset Illness or Injury  Outcome: Ongoing, Progressing  Goal: Optimized Coping Skills in Response to Life Stressors  Outcome: Ongoing, Progressing     Problem: Behavior Regulation Impairment (Psychotic Signs/Symptoms)  Goal: Improved Behavioral Control (Psychotic Signs/Symptoms)  Outcome: Ongoing, Progressing     Problem: Decreased Participation and Engagement (Psychotic Signs/Symptoms)  Goal: Increased Participation and Engagement (Psychotic Signs/Symptoms)  Outcome: Ongoing, Progressing     Problem: Mood Impairment (Psychotic Signs/Symptoms)  Goal: Improved Mood Symptoms (Psychotic Signs/Symptoms)  Outcome: Ongoing, Progressing

## 2024-03-03 NOTE — PLAN OF CARE
POC reviewed this shift and is on going. Patient is withdrawn, depressed, calm, cooperative, quiet, anxious, and sleeping. Denies Suicidal Ideation, Homicidal Ideation, Auditory Hallucinations, Visual Hallucinations, Tactile Hallucinations, Gustatory Hallucinations, and Delusions. Patient continues to isolate in his room sleeping most of the day. Patient comes out for meals and snacks. Dr Sparrow increased the patient's propanolol to 10mg. Pt continues to be medication compliant and staff will continue to monitor pt closely while on the unit.

## 2024-03-04 LAB — VALPROATE SERPL-MCNC: 65 UG/ML (ref 50–100)

## 2024-03-04 PROCEDURE — 25000003 PHARM REV CODE 250: Performed by: STUDENT IN AN ORGANIZED HEALTH CARE EDUCATION/TRAINING PROGRAM

## 2024-03-04 PROCEDURE — 90833 PSYTX W PT W E/M 30 MIN: CPT | Mod: ,,, | Performed by: PSYCHIATRY & NEUROLOGY

## 2024-03-04 PROCEDURE — 99233 SBSQ HOSP IP/OBS HIGH 50: CPT | Mod: ,,, | Performed by: PSYCHIATRY & NEUROLOGY

## 2024-03-04 PROCEDURE — 25000003 PHARM REV CODE 250: Performed by: INTERNAL MEDICINE

## 2024-03-04 PROCEDURE — 80164 ASSAY DIPROPYLACETIC ACD TOT: CPT | Performed by: PSYCHIATRY & NEUROLOGY

## 2024-03-04 PROCEDURE — 11400000 HC PSYCH PRIVATE ROOM

## 2024-03-04 PROCEDURE — 36415 COLL VENOUS BLD VENIPUNCTURE: CPT | Performed by: PSYCHIATRY & NEUROLOGY

## 2024-03-04 PROCEDURE — 25000003 PHARM REV CODE 250: Performed by: PSYCHIATRY & NEUROLOGY

## 2024-03-04 RX ADMIN — DIVALPROEX SODIUM 500 MG: 500 TABLET, FILM COATED, EXTENDED RELEASE ORAL at 08:03

## 2024-03-04 RX ADMIN — PROPRANOLOL HYDROCHLORIDE 10 MG: 10 TABLET ORAL at 08:03

## 2024-03-04 RX ADMIN — AMLODIPINE BESYLATE 10 MG: 10 TABLET ORAL at 08:03

## 2024-03-04 NOTE — PROGRESS NOTES
"PSYCHIATRY DAILY INPATIENT PROGRESS NOTE  SUBSEQUENT HOSPITAL VISIT    ENCOUNTER DATE: 3/4/2024  SITE: [facility]    DATE OF ADMISSION: 2/29/2024  4:17 AM  LENGTH OF STAY: 4 days      CHIEF COMPLAINT   Jaki Gong is a 28 y.o. male, seen during daily dennis rounds on the inpatient unit.  Jaki Gong presented with the chief complaint of psychosis      The patient was seen and examined. The chart was reviewed.     Reviewed notes from Rns and labs from the last 24 hours.    The patient's case was discussed with the treatment team/care providers today including Rns    Staff reports no behavioral or management issues.     The patient has been compliant with treatment.      Subjective 03/04/2024    Today, the patient again reports that he is doing "much better." He discussed how he and his father "got into it" but this was again vaguely described.  -he notes lessening AH; less  RIS noted   He feels that stress is a trigger for the AH- he was able to describe stress management techniques    Still seeking collateral notes from [facility].   -he discussed grief over his cousin's death.     His BP was better with propranolol; he remains asymptomatic.     No aggressive behaviors has been displayed on the unit.    He will be stable for discharge home tomorrow     The patient denies any side effects to medications.          Psychiatric ROS (observed, reported, or endorsed/denied):  Depressed mood - No  Interest/pleasure/anhedonia: No  Guilt/hopelessness/worthlessness - No  Changes in Sleep - No  Changes in Appetite - No  Changes in Concentration - No  Changes in Energy - No  PMA/R- No  Suicidal- active/passive ideations - No  Homicidal ideations: active/passive ideations - No    Hallucinations - improving steadily  Delusions - No  Disorganized behavior - No  Disorganized speech - No  Negative symptoms - Yes    Elevated mood - No  Decreased need for sleep - No  Grandiosity - No  Racing thoughts - No  Impulsivity - " No  Irritability- No  Increased energy - No  Distractibility - No  Increase in goal-directed activity or PMA- No    Symptoms of ART - No  Symptoms of Panic Disorder- No  Symptoms of PTSD - No        Overall progress: Patient is showing moderate improvement        Psychotherapy:  Target symptoms: psychosis  Why chosen therapy is appropriate versus another modality: relevant to diagnosis, evidence based practice  Outcome monitoring methods: self-report, observation  Therapeutic intervention type: insight oriented psychotherapy, supportive psychotherapy  Topics discussed/themes: building skills sets for symptom management, symptom recognition  The patient's response to the intervention is more accepting. The patient's progress toward treatment goals is fair.   Duration of intervention: 16 minutes.        Medical ROS  Review of Systems   Constitutional: Negative.    HENT: Negative.     Eyes: Negative.    Respiratory: Negative.     Cardiovascular: Negative.    Gastrointestinal: Negative.    Genitourinary: Negative.    Musculoskeletal: Negative.    Skin: Negative.    Neurological: Negative.    Endo/Heme/Allergies: Negative.    Psychiatric/Behavioral:          See HPI         PAST MEDICAL HISTORY   History reviewed. No pertinent past medical history.        PSYCHOTROPIC MEDICATIONS   Scheduled Meds:   amLODIPine  10 mg Oral Daily    divalproex ER  500 mg Oral BID    propranoloL  10 mg Oral BID     Continuous Infusions:  PRN Meds:.acetaminophen, aluminum-magnesium hydroxide-simethicone, benzonatate, benztropine mesylate, hydrOXYzine pamoate, loperamide, nicotine, OLANZapine **AND** OLANZapine, ondansetron, promethazine        EXAMINATION    VITALS   Vitals:    03/02/24 1914 03/03/24 0736 03/03/24 1923 03/04/24 0745   BP: 124/71 (!) 164/107 (!) 140/82 119/76   BP Location: Left arm Left arm Left arm Left arm   Patient Position: Sitting Sitting Sitting Sitting   Pulse: 98 106 92 71   Resp: 20 20 20 20   Temp: 98.7 °F (37.1  °C) 98.7 °F (37.1 °C) 98 °F (36.7 °C) 97.9 °F (36.6 °C)   TempSrc: Oral Oral Oral Oral   SpO2: 95% 95% (!) 91% 96%   Weight:       Height:           Body mass index is 40.6 kg/m².        CONSTITUTIONAL  General Appearance: unremarkable, age appropriate, obese    MUSCULOSKELETAL  Muscle Strength and Tone:no tremor, no tic  Abnormal Involuntary Movements: None  Gait and Station: non-ataxic    PSYCHIATRIC   Level of Consciousness: awake, alert , and oriented  Orientation: person, place, time, and situation  Grooming: improved; in Hospital garb  Psychomotor Behavior: normal, cooperative, eye contact minimal  Speech: normal tone, normal rate, normal pitch, normal volume  Language: grossly intact, able to name, able to repeat  Mood: fine  Affect: Blunted and Constricted  Thought Process: linear, logical  Associations: intact   Thought Content: denies SI and denies HI  Perceptions: Endorses recent auditory hallucinations  Memory: Improving; intact to recent and remote events   Attention:less Easily distracted- more intact to conversation  Fund of Knowledge: Impaired  Estimate if Intelligence:  Average based on work/education history, vocabulary and mental status exam  Insight: limited awareness of illness but improving   Judgment: limited but improving        DIAGNOSTIC TESTING   Laboratory Results  Recent Results (from the past 24 hour(s))   Valproic Acid    Collection Time: 03/04/24  5:46 AM   Result Value Ref Range    Valproic Acid Level 65 50 - 100 ug/mL             MEDICAL DECISION MAKING      ASSESSMENT:   Schizophrenia, chronic, with acute exacerbation, severe  Obesity  Elevated A1c/BG  Elevated LDL  HTN  Psychosocial stressors             PROBLEM LIST AND MANAGEMENT PLANS        Schizophrenia, chronic, with acute exacerbation, severe  - pt reports due to invega STOKES dose; [facility] [place] [facility] verify  - resumed/continue depakote at 500 mg PO BID, level was 65 on 3/4/24  - pt counseled     Obesity:  pt counseled   - avoid medications with high potential for serious weight gain (remeron, seroquel, etc.)     Elevated A1c/BG: pt counseled   - FM consulted  - consider metformin     Elevated LDL: pt counseled   - FM consulted  - consider statin     HTN: pt counseled   - FM consulted  - on norvasc at home   Resumed/continue Norvasc 10 mg po q Day  -started/continue trial of propranolol 10 mg po TID     Psychosocial stressors: pt counseled  -SW consulted to assist with resources         Discussed diagnosis, risks and benefits of proposed treatment vs alternative treatments vs no treatment, potential side effects of these treatments and the inherent unpredictability of treatment. The patient expresses understanding of the above and displays the capacity to agree with this treatment given said understanding. Patient also agrees that, currently, the benefits outweigh the risks and would like to pursue/continue treatment at this time.    Any medications being used off-label were discussed with the patient inclusive of the evidence base for the use of the medications and consent was obtained for the off-label use of the medication.       DISCHARGE PLANNING  Expected Disposition Plan: Home or Self Care- pt will likely be stable for discharge on Tuesday/tomorrow       NEED FOR CONTINUED HOSPITALIZATION  Psychiatric illness continues to pose a potential threat to life or bodily function, of self or others, thereby requiring the need for continued inpatient psychiatric hospitalization: Yes, due to: significant psychotic thought disorder, as evidenced by:  Ongoing concerns with grave disability with patient unable to perform basic feeding, hygiene and dressing activities without significant constant support.    Protective inpatient pyschiatric hospitalization required while a safe disposition plan is enacted: Yes    Patient stabilized and ready for discharge from inpatient psychiatric unit: No        STAFF:   Alejandro BANKS  MD [facility]  Psychiatry

## 2024-03-04 NOTE — NURSING
POC reviewed this shift and is ongoing.  Pt is calm and cooperative on unit and complaint with medications.  Pt denies SI/HI/VH.  Pt endorses AH, but states the voices aren't telling him anything bad.

## 2024-03-04 NOTE — PLAN OF CARE
Pt was encouraged to attend group but refused. Pt was offered 1:1 but refused.  Staff will Continue to encourage pt to participate in process groups to verbalize feelings and develop healthy coping skills.

## 2024-03-05 VITALS
OXYGEN SATURATION: 96 % | TEMPERATURE: 98 F | HEART RATE: 88 BPM | BODY MASS INDEX: 40.72 KG/M2 | RESPIRATION RATE: 20 BRPM | DIASTOLIC BLOOD PRESSURE: 95 MMHG | SYSTOLIC BLOOD PRESSURE: 127 MMHG | HEIGHT: 69 IN | WEIGHT: 274.94 LBS

## 2024-03-05 PROCEDURE — 99239 HOSP IP/OBS DSCHRG MGMT >30: CPT | Mod: ,,, | Performed by: PSYCHIATRY & NEUROLOGY

## 2024-03-05 PROCEDURE — 25000003 PHARM REV CODE 250: Performed by: INTERNAL MEDICINE

## 2024-03-05 PROCEDURE — 25000003 PHARM REV CODE 250: Performed by: STUDENT IN AN ORGANIZED HEALTH CARE EDUCATION/TRAINING PROGRAM

## 2024-03-05 PROCEDURE — 25000003 PHARM REV CODE 250: Performed by: PSYCHIATRY & NEUROLOGY

## 2024-03-05 PROCEDURE — 90833 PSYTX W PT W E/M 30 MIN: CPT | Mod: ,,, | Performed by: PSYCHIATRY & NEUROLOGY

## 2024-03-05 RX ORDER — PROPRANOLOL HYDROCHLORIDE 10 MG/1
10 TABLET ORAL 2 TIMES DAILY
Qty: 60 TABLET | Refills: 1 | Status: SHIPPED | OUTPATIENT
Start: 2024-03-05 | End: 2025-03-05

## 2024-03-05 RX ORDER — DIVALPROEX SODIUM 500 MG/1
500 TABLET, FILM COATED, EXTENDED RELEASE ORAL 2 TIMES DAILY
Qty: 60 TABLET | Refills: 1 | Status: SHIPPED | OUTPATIENT
Start: 2024-03-05 | End: 2025-03-05

## 2024-03-05 RX ADMIN — PROPRANOLOL HYDROCHLORIDE 10 MG: 10 TABLET ORAL at 08:03

## 2024-03-05 RX ADMIN — DIVALPROEX SODIUM 500 MG: 500 TABLET, FILM COATED, EXTENDED RELEASE ORAL at 08:03

## 2024-03-05 RX ADMIN — AMLODIPINE BESYLATE 10 MG: 10 TABLET ORAL at 08:03

## 2024-03-05 NOTE — DISCHARGE SUMMARY
"Discharge Summary  Psychiatry    Admit Date: 2/29/2024    Discharge Date and Time:  03/05/2024 7:37 AM    Attending Physician: Alejandro Sparrow MD     Discharge Provider: Alejandro Sparrow MD    Reason for Admission:  hallucinations        History of Present Illness:   The patient presented to the ER on 2/29/2024 .     The patient was medically cleared and admitted to the [facility].     Per ED MD:    AUDITORY/VISUAL HALLUCINATIONS, PATIENT VERBALIZED KNOWING THEY ARE NOT REAL, MAKING STATEMENTS ABOUT [employer]Y [employer], REPORTS MISSING MEDICATIONS      This is a 28 year old male with schizophrenia who presents to the ED with father after concerns of psychotic behavior and not taking his psychotropic medications for over 2 days.  He has a flat affect with delusions and hallucinations.  The history is limited.  He denies suicidal or homicidal ideation.       Per RN;    Patient unable to follow simple conversation, unable to illicit responses as to suicidal thoughts. Patient appears to be having ongoing auditory hallucinations and appears to respond verbally to things said. Patients speech is tangential.         Psychiatric interview:  "I am schizophrenic and I didn't have no medicine, my dad was messing with me." He states "I hear voices, a lot," reports AH daily since childhood. Reports AH are "usually negative, I'm highly aggressive." Pt an extremely limited historian and he was unable to provide any further information regarding HPI.           Symptoms of Depression: diminished mood - No, loss of interest/anhedonia - No;       Changes in Sleep: trouble with initiation- No, maintenance, - No early morning awakening with inability to return to sleep - No, hypersomnolence - No     Suicidal- active/passive ideations - No, organized plans, future intentions - No     Homicidal ideations: active/passive ideations - No, organized plans, future intentions - No     Symptoms of psychosis: hallucinations - Yes, delusions " "- Yes, disorganized speech - Yes, disorganized behavior or abnormal motor behavior - No, or negative symptoms (diminshed emotional expression, avolition, anhedonia, alogia, asociality) - Yes, active phase symptoms >1 month - Yes, continuous signs of illness > 6 months - Yes, since onset of illness decreased level of functioning present - Yes     Symptoms of varun or hypomania: elevated, expansive, or irritable mood with increased energy or activity - No; > 4 days - No,  >7 days - No; with inflated self-esteem or grandiosity - No, decreased need for sleep - No, increased rate of speech - No, FOI or racing thoughts - No, distractibility - No, increased goal directed activity or PMA - No, risky/disinhibited behavior - No     Symptoms of ART: excessive anxiety/worry/fear, more days than not, about numerous issues - No, ongoing for >6 months - No, difficult to control - No, with restlessness - No, fatigue - No, poor concentration - No, irritability - No, muscle tension - No, sleep disturbance - No; causes functionally impairing distress - No     Symptoms of Panic Disorder: recurrent panic attacks (palpitations/heart racing, sweating, shakiness, dyspnea, choking, chest pain/discomfort, Gi symptoms, dizzy/lightheadedness, hot/col flashes, paresthesias, derealization, fear of losing control or fear of dying or fear of "going crazy") - No, precipitated - No, un-precipitated - No, source of worry and/or behavioral changes secondary for 1 month or longer- No, agoraphobia - No     Symptoms of PTSD: h/o trauma exposure - No; re-experiencing/intrusive symptoms - No, avoidant behavior - No, 2 or more negative alterations in cognition or mood - No, 2 or more hyperarousal symptoms - No; with dissociative symptoms - No, ongoing for 1 or more  months - No          Procedures Performed: * No surgery found *    Hospital Course:    Patient was admitted to the inpatient psychiatry unit after being medically cleared in the ED. Chart and " labs were reviewed. The patient was stabilized as follows:      Schizophrenia, chronic, with acute exacerbation, severe  - pt reports due to invega STOKES dose; will need to contact [facility] team to verify  - resumed/continue depakote at 500 mg PO BID, level was 65 on 3/4/24  - pt counseled     Obesity: pt counseled   - avoid medications with high potential for serious weight gain (remeron, seroquel, etc.)     Elevated A1c/BG: pt counseled   - FM consulted  - consider metformin     Elevated LDL: pt counseled   - FM consulted  - consider statin     HTN: pt counseled   - FM consulted  - on norvasc at home              Resumed/continue Norvasc 10 mg po q Day  -started/continue trial of propranolol 10 mg po TID      Psychosocial stressors: pt counseled  -SW consulted to assist with resources         During hospitalization, the patient was encouraged to go to both groups and individual counseling. Patient was monitored for any side effects. A meeting was held with multidisciplinary team prior to discharge and pt's diagnosis, current medications, and follow up were discussed. The patient has been compliant with treatment and can adequately attend to activities of daily living in an independent manner. The patient denies any side effects. The patient denies SI, HI, plan or intent for self harm or harm to others. The patient is no longer a danger to self or others nor gravely disabled disabled. Patient discharged  in stable condition with scheduled outpatient follow up.    AIMS score was 0    The patient reports improved symptoms as documented below. The patient is requesting discharge. The patient is currently stable for discharge home and is able/willing to attend outpatient care. The patient is hopeful, future oriented and goal directed. The patient readily discusses both short and long term goals. The patient can identify positive coping skills and social support.     Psychiatric ROS (observed, reported, or  endorsed/denied):  Depressed mood - No  Interest/pleasure/anhedonia: No  Guilt/hopelessness/worthlessness - No  Changes in Sleep - No  Changes in Appetite - No  Changes in Concentration - No  Changes in Energy - No  PMA/R- No  Suicidal- active/passive ideations - No  Homicidal ideations: active/passive ideations - No     Hallucinations - improving steadily  Delusions - No  Disorganized behavior - No  Disorganized speech - No  Negative symptoms - Yes     Elevated mood - No  Decreased need for sleep - No  Grandiosity - No  Racing thoughts - No  Impulsivity - No  Irritability- No  Increased energy - No  Distractibility - No  Increase in goal-directed activity or PMA- No     Symptoms of ART - No  Symptoms of Panic Disorder- No  Symptoms of PTSD - No    Discussed diagnosis, risks and benefits of proposed treatment vs alternative treatments vs no treatment, and potential side effects of these treatments.  The patient expresses understanding of the above and displays the capacity to agree with this treatment given said understanding.  Patient also agrees that, currently, the benefits outweigh the risks and would like to pursue treatment at this time.      Discharge MSE: stated age, casually dressed, well groomed.  No psychomotor agitation or retardation.  No abnormal involuntary movements.  Gait normal.  Speech normal, conversational.  Language fluent English. Mood fine.  Affect normal range, pleasant, euthymic.  Thought process linear.  Associations intact.  Denies suicidal or homicidal ideation.  Denies auditory hallucinations, paranoid ideation, ideas of reference.  Memory intact.  Attention intact.  Fund of knowledge intact.  Insight intact.  Judgment intact.  Alert and oriented to person, place, time.      Tobacco Usage:  Is patient a smoker? No  Does patient want prescription for Tobacco Cessation? No  Does patient want counseling for Tobacco Cessation? No    If patient would like to quit, then over the counter  nicotine patch could be used. The patient could also follow up with his PCP or psychiatric provider for other alternatives.     Final Diagnoses:    Principal Problem: Schizophrenia, chronic, with acute exacerbation, severe    Secondary Diagnoses:   Obesity  Elevated A1c/BG  Elevated LDL  HTN  Psychosocial stressors      Labs:  Admission on 02/29/2024   Component Date Value Ref Range Status    Cholesterol 02/29/2024 222 (H)  120 - 199 mg/dL Final    Triglycerides 02/29/2024 92  30 - 150 mg/dL Final    HDL 02/29/2024 40  40 - 75 mg/dL Final    LDL Cholesterol 02/29/2024 163.6 (H)  63.0 - 159.0 mg/dL Final    HDL/Cholesterol Ratio 02/29/2024 18.0 (L)  20.0 - 50.0 % Final    Total Cholesterol/HDL Ratio 02/29/2024 5.6 (H)  2.0 - 5.0 Final    Non-HDL Cholesterol 02/29/2024 182  mg/dL Final    Hemoglobin A1C 02/29/2024 5.8 (H)  4.0 - 5.6 % Final    Estimated Avg Glucose 02/29/2024 120  68 - 131 mg/dL Final    Valproic Acid Level 03/04/2024 65  50 - 100 ug/mL Final   Admission on 02/28/2024, Discharged on 02/29/2024   Component Date Value Ref Range Status    WBC 02/28/2024 8.68  3.90 - 12.70 K/uL Final    RBC 02/28/2024 4.58 (L)  4.60 - 6.20 M/uL Final    Hemoglobin 02/28/2024 14.4  14.0 - 18.0 g/dL Final    Hematocrit 02/28/2024 43.6  40.0 - 54.0 % Final    MCV 02/28/2024 95  82 - 98 fL Final    MCH 02/28/2024 31.4 (H)  27.0 - 31.0 pg Final    MCHC 02/28/2024 33.0  32.0 - 36.0 g/dL Final    RDW 02/28/2024 12.4  11.5 - 14.5 % Final    Platelets 02/28/2024 243  150 - 450 K/uL Final    MPV 02/28/2024 10.7  9.2 - 12.9 fL Final    Immature Granulocytes 02/28/2024 0.3  0.0 - 0.5 % Final    Gran # (ANC) 02/28/2024 5.8  1.8 - 7.7 K/uL Final    Immature Grans (Abs) 02/28/2024 0.03  0.00 - 0.04 K/uL Final    Lymph # 02/28/2024 1.9  1.0 - 4.8 K/uL Final    Mono # 02/28/2024 0.9  0.3 - 1.0 K/uL Final    Eos # 02/28/2024 0.0  0.0 - 0.5 K/uL Final    Baso # 02/28/2024 0.02  0.00 - 0.20 K/uL Final    nRBC 02/28/2024 0  0 /100 WBC Final     Gran % 02/28/2024 66.8  38.0 - 73.0 % Final    Lymph % 02/28/2024 21.8  18.0 - 48.0 % Final    Mono % 02/28/2024 10.8  4.0 - 15.0 % Final    Eosinophil % 02/28/2024 0.1  0.0 - 8.0 % Final    Basophil % 02/28/2024 0.2  0.0 - 1.9 % Final    Differential Method 02/28/2024 Automated   Final    Sodium 02/28/2024 142  136 - 145 mmol/L Final    Potassium 02/28/2024 3.8  3.5 - 5.1 mmol/L Final    Chloride 02/28/2024 105  95 - 110 mmol/L Final    CO2 02/28/2024 24  23 - 29 mmol/L Final    Glucose 02/28/2024 152 (H)  70 - 110 mg/dL Final    BUN 02/28/2024 8  6 - 20 mg/dL Final    Creatinine 02/28/2024 1.0  0.5 - 1.4 mg/dL Final    Calcium 02/28/2024 9.7  8.7 - 10.5 mg/dL Final    Total Protein 02/28/2024 8.2  6.0 - 8.4 g/dL Final    Albumin 02/28/2024 4.1  3.5 - 5.2 g/dL Final    Total Bilirubin 02/28/2024 0.4  0.1 - 1.0 mg/dL Final    Alkaline Phosphatase 02/28/2024 68  55 - 135 U/L Final    AST 02/28/2024 17  10 - 40 U/L Final    ALT 02/28/2024 30  10 - 44 U/L Final    eGFR 02/28/2024 >60.0  >60 mL/min/1.73 m^2 Final    Anion Gap 02/28/2024 13  8 - 16 mmol/L Final    TSH 02/28/2024 1.106  0.400 - 4.000 uIU/mL Final    Specimen UA 02/28/2024 Urine, Clean Catch   Final    Color, UA 02/28/2024 Yellow  Yellow, Straw, Stephanie Final    Appearance, UA 02/28/2024 Hazy (A)  Clear Final    pH, UA 02/28/2024 8.0  5.0 - 8.0 Final    Specific Gravity, UA 02/28/2024 1.030  1.005 - 1.030 Final    Protein, UA 02/28/2024 1+ (A)  Negative Final    Glucose, UA 02/28/2024 Negative  Negative Final    Ketones, UA 02/28/2024 Negative  Negative Final    Bilirubin (UA) 02/28/2024 Negative  Negative Final    Occult Blood UA 02/28/2024 Negative  Negative Final    Nitrite, UA 02/28/2024 Negative  Negative Final    Urobilinogen, UA 02/28/2024 Negative  Negative EU/dL Final    Leukocytes, UA 02/28/2024 Negative  Negative Final    Amphetamine Screen, Ur 02/28/2024 Negative  Negative Final    Barbiturate Screen, Ur 02/28/2024 Negative  Negative Final     Benzodiazepines 02/28/2024 Negative  Negative Final    Cocaine (Metab.) 02/28/2024 Negative  Negative Final    Opiate Scrn, Ur 02/28/2024 Negative  Negative Final    Phencyclidine 02/28/2024 Negative  Negative Final    THC 02/28/2024 Negative  Negative Final    Tricyclic Antidepressants (TCA), U* 02/28/2024 Negative  Negative Final    MDMA- ECSTASY 02/28/2024 Negative  Negative Final    OXYCODONE 02/28/2024 Negative  Negative Final    PROPOXYPHENE 02/28/2024 Negative  Negative Final    Toxicology Information 02/28/2024 SEE COMMENT   Final    Alcohol, Serum 02/28/2024 <10  <10 mg/dL Final    Acetaminophen (Tylenol), Serum 02/28/2024 <3.0 (L)  10.0 - 20.0 ug/mL Final    POC Rapid COVID 02/28/2024 Negative  Negative Final     Acceptable 02/28/2024 Yes   Final    RBC, UA 02/28/2024 1  0 - 4 /hpf Final    WBC, UA 02/28/2024 1  0 - 5 /hpf Final    Bacteria 02/28/2024 None  None-Occ /hpf Final    Squam Epithel, UA 02/28/2024 0  /hpf Final    Hyaline Casts, UA 02/28/2024 0  0-1/lpf /lpf Final    Amorphous, UA 02/28/2024 Rare  None-Moderate Final    Microscopic Comment 02/28/2024 SEE COMMENT   Final         Discharged Condition: stable and improved; not currently a danger to self/others or gravely disabled    Disposition: Home or Self Care    Is patient being discharged on multiple neuroleptics? No    Follow Up/Patient Instructions:     Take all medications as prescribed.  Attend all psychiatric and medical follow up appointments.   Abstain from all drugs and alcohol.  Call the crisis line at: [phone] for help in a crisis and emergent situations or call 911 and Return to ED for any acute worsening of your condition including suicidal or homicidal ideations      No discharge procedures on file.        Follow up apt: ACT team- see SW/discharge notes      Medications:  Reconciled Home Medications:      Medication List        START taking these medications      divalproex  MG Tb24  Commonly known as:  DEPAKOTE ER  Take 1 tablet (500 mg total) by mouth 2 (two) times a day.  Replaces: divalproex 250 MG EC tablet     propranoloL 10 MG tablet  Commonly known as: INDERAL  Take 1 tablet (10 mg total) by mouth 2 (two) times daily.            CONTINUE taking these medications      amLODIPine 10 MG tablet  Commonly known as: NORVASC  Take 1 tablet (10 mg total) by mouth once daily.     paliperidone palmitate 156 mg/mL Syrg injection  Commonly known as: INVEGA SUSTENNA  Inject 1 mL (156 mg total) into the muscle every 28 days.            STOP taking these medications      divalproex 250 MG EC tablet  Commonly known as: DEPAKOTE  Replaced by: divalproex  MG Tb24     sertraline 100 MG tablet  Commonly known as: ZOLOFT     traZODone 50 MG tablet  Commonly known as: DESYREL                Diet: regular     Activity as tolerated    Total time spent discharging patient: 35 minutes    Alejandro Sparrow MD  Psychiatry

## 2024-03-05 NOTE — PROGRESS NOTES
Psychotherapy:  Target symptoms: psychosis  Why chosen therapy is appropriate versus another modality: relevant to diagnosis, evidence based practice  Outcome monitoring methods: self-report, observation  Therapeutic intervention type: insight oriented psychotherapy, supportive psychotherapy  Topics discussed/themes: building skills sets for symptom management, symptom recognition  Safety planning and wrap up session  The patient's response to the intervention is more accepting. The patient's progress toward treatment goals is fair.   Duration of intervention: 16 minutes.    Alejandro Sparrow MD  Psychiatry

## 2024-03-05 NOTE — NURSING
Discharge orders noted and given to pt including follow up and med instruction.  Verbalized understanding.  Copy of AVS given to pt.  All personal belongings returned to pt.  See signed inventory form in chart.  Pt denies si, hi or a v hallucinations.   Pt will continue to follow up with cm.  See AVS for date and time. Pt dc'd home ambulatory in [facility] with belongings at side. Pt escorted downstairs per mht to medicaid transportation van. Pt states him and his dad will  his meds when he gets home.

## 2024-03-05 NOTE — PLAN OF CARE
03/05/24 0920   Medicare Message   Important Message from Medicare regarding Discharge Appeal Rights Given to patient/caregiver;Explained to patient/caregiver;Signed/date by patient/caregiver;Other (comments)   Date IMM was signed 03/05/24   Time IMM was signed 0909

## 2024-03-05 NOTE — NURSING
POC reviewed this shift and is ongoing.  Pt is calm and cooperative on unit and complaint with medications.  Pt denies SI/HI, reports AH are much better.  Pt states that the voices aren't bothering him or telling him bad things currently.

## 2024-03-05 NOTE — PLAN OF CARE
Problem: Adult Behavioral Health Plan of Care  Goal: Plan of Care Review  Outcome: Ongoing, Progressing  Goal: Patient-Specific Goal (Individualization)  Outcome: Ongoing, Progressing  Goal: Adheres to Safety Considerations for Self and Others  Outcome: Ongoing, Progressing  Goal: Absence of New-Onset Illness or Injury  Outcome: Ongoing, Progressing  Goal: Optimized Coping Skills in Response to Life Stressors  Outcome: Ongoing, Progressing     Problem: Behavior Regulation Impairment (Psychotic Signs/Symptoms)  Goal: Improved Behavioral Control (Psychotic Signs/Symptoms)  Outcome: Ongoing, Progressing     Problem: Decreased Participation and Engagement (Psychotic Signs/Symptoms)  Goal: Increased Participation and Engagement (Psychotic Signs/Symptoms)  Outcome: Ongoing, Progressing

## 2024-03-05 NOTE — PLAN OF CARE
Problem: Adult Behavioral Health Plan of Care  Goal: Optimized Coping Skills in Response to Life Stressors  Intervention: Promote Effective Coping Strategies  Flowsheets (Taken 3/4/2024 1600)  Supportive Measures:   active listening utilized   self-reflection promoted   self-responsibility promoted   verbalization of feelings encouraged       Behavior:  Pt was oriented. Pt thoughts were disorganized.           Intervention:  In this CBT-focused process group CSW facilitated a group discussion on motivation. Pt was asked to identified who or what motivates you, what keeps you going when times get tough, what advice or positive saying have you heard that inspire and motivate you personally           Response: Pt identified himself and reading his bible as motivation. Pt identified praying as what keeps him going when times get tough. Pt was not able to verbalize positive saying that inspires and motivates personally.           Plan: Continue to encourage pt to participate in process groups to verbalize feelings and develop healthy coping skills.

## 2024-03-06 NOTE — PLAN OF CARE
Problem: Adult Behavioral Health Plan of Care  Goal: Optimized Coping Skills in Response to Life Stressors  Intervention: Promote Effective Coping Strategies  Flowsheets (Taken 3/5/2024 1415)  Supportive Measures:   active listening utilized   verbalization of feelings encouraged   self-reflection promoted   problem-solving facilitated       Behavior:  Pt was oriented during group.            Intervention:  In this CBT-focused process group LMSW facilitated a group discussion on self-sabotaging behaviors.  Each patient was asked to identify ways in which they may self-sabotage things in their life  and ways in which they can defeat these self-sabotaging behaviors.          Response:  Pt stated I know this has nothing to do with what we are talking about but I hear voices, I be going through a lot because of the voices. I cry a lot because of the voices. I talk to my uncle sometimes he's a counselor but sometimes he talks negative about me. Pt stated I think I'm going to buy a punching bag to relieve some stress.         Plan:    Continue to encourage pt to participate in process groups to verbalize feelings and develop healthy coping skills.